# Patient Record
Sex: FEMALE | Race: WHITE | NOT HISPANIC OR LATINO | Employment: PART TIME | URBAN - METROPOLITAN AREA
[De-identification: names, ages, dates, MRNs, and addresses within clinical notes are randomized per-mention and may not be internally consistent; named-entity substitution may affect disease eponyms.]

---

## 2017-04-05 ENCOUNTER — LAB VISIT (OUTPATIENT)
Dept: LAB | Facility: HOSPITAL | Age: 72
End: 2017-04-05
Attending: INTERNAL MEDICINE
Payer: MEDICARE

## 2017-04-05 DIAGNOSIS — E78.5 HYPERLIPIDEMIA: ICD-10-CM

## 2017-04-05 LAB
ALBUMIN SERPL BCP-MCNC: 3.7 G/DL
ALP SERPL-CCNC: 87 U/L
ALT SERPL W/O P-5'-P-CCNC: 25 U/L
ANION GAP SERPL CALC-SCNC: 7 MMOL/L
AST SERPL-CCNC: 32 U/L
BILIRUB SERPL-MCNC: 0.4 MG/DL
BUN SERPL-MCNC: 18 MG/DL
CALCIUM SERPL-MCNC: 9.2 MG/DL
CHLORIDE SERPL-SCNC: 109 MMOL/L
CHOLEST/HDLC SERPL: 3.7 {RATIO}
CO2 SERPL-SCNC: 27 MMOL/L
CREAT SERPL-MCNC: 1 MG/DL
EST. GFR  (AFRICAN AMERICAN): >60 ML/MIN/1.73 M^2
EST. GFR  (NON AFRICAN AMERICAN): 56 ML/MIN/1.73 M^2
GLUCOSE SERPL-MCNC: 88 MG/DL
HDL/CHOLESTEROL RATIO: 26.9 %
HDLC SERPL-MCNC: 160 MG/DL
HDLC SERPL-MCNC: 43 MG/DL
LDLC SERPL CALC-MCNC: 97 MG/DL
NONHDLC SERPL-MCNC: 117 MG/DL
POTASSIUM SERPL-SCNC: 4.4 MMOL/L
PROT SERPL-MCNC: 7 G/DL
SODIUM SERPL-SCNC: 143 MMOL/L
TRIGL SERPL-MCNC: 100 MG/DL

## 2017-04-05 PROCEDURE — 80053 COMPREHEN METABOLIC PANEL: CPT

## 2017-04-05 PROCEDURE — 80061 LIPID PANEL: CPT

## 2017-04-05 PROCEDURE — 36415 COLL VENOUS BLD VENIPUNCTURE: CPT | Mod: PO

## 2017-04-10 ENCOUNTER — TELEPHONE (OUTPATIENT)
Dept: CARDIOLOGY | Facility: CLINIC | Age: 72
End: 2017-04-10

## 2017-06-09 ENCOUNTER — OFFICE VISIT (OUTPATIENT)
Dept: CARDIOLOGY | Facility: CLINIC | Age: 72
End: 2017-06-09
Payer: MEDICARE

## 2017-06-09 VITALS
BODY MASS INDEX: 21.22 KG/M2 | SYSTOLIC BLOOD PRESSURE: 136 MMHG | HEIGHT: 68 IN | WEIGHT: 140 LBS | HEART RATE: 64 BPM | DIASTOLIC BLOOD PRESSURE: 70 MMHG

## 2017-06-09 DIAGNOSIS — I25.10 CORONARY ARTERY DISEASE INVOLVING NATIVE CORONARY ARTERY OF NATIVE HEART WITHOUT ANGINA PECTORIS: ICD-10-CM

## 2017-06-09 DIAGNOSIS — I73.9 PVD (PERIPHERAL VASCULAR DISEASE): ICD-10-CM

## 2017-06-09 DIAGNOSIS — E78.2 MIXED HYPERLIPIDEMIA: ICD-10-CM

## 2017-06-09 DIAGNOSIS — I10 ESSENTIAL HYPERTENSION: Primary | ICD-10-CM

## 2017-06-09 PROCEDURE — 1159F MED LIST DOCD IN RCRD: CPT | Mod: S$GLB,,, | Performed by: INTERNAL MEDICINE

## 2017-06-09 PROCEDURE — 99213 OFFICE O/P EST LOW 20 MIN: CPT | Mod: S$GLB,,, | Performed by: INTERNAL MEDICINE

## 2017-06-09 PROCEDURE — 99999 PR PBB SHADOW E&M-EST. PATIENT-LVL III: CPT | Mod: PBBFAC,,, | Performed by: INTERNAL MEDICINE

## 2017-06-09 PROCEDURE — 1125F AMNT PAIN NOTED PAIN PRSNT: CPT | Mod: S$GLB,,, | Performed by: INTERNAL MEDICINE

## 2017-06-09 NOTE — PROGRESS NOTES
Subjective:   Patient ID:  Charlie Allen is a 72 y.o. female who presents for follow up of Coronary Artery Disease; Hyperlipidemia; and Hypertension      71 yo female, f/u with Dr. Castelan before.  Hx of nonobstructive CAD, s/p [facility] 2013. 2D echo today normal LVEF, HTN, PAD, Mild carotid Dz, and HLD.  Patient feels OK, no chest pain, no sob, no dizziness, no syncope, no CNS symptoms.  Patient has fairly good exercise tolerance.  Patient is compliant with medications.        Past Medical History:   Diagnosis Date    *Atrial flutter     CAD (coronary artery disease) 2013    Heart murmur     Hyperlipidemia     Hypertension        Past Surgical History:   Procedure Laterality Date     SECTION, CLASSIC      TONSILLECTOMY, ADENOIDECTOMY  as a child       Social History   Substance Use Topics    Smoking status: Never Smoker    Smokeless tobacco: Not on file    Alcohol use No       Family History   Problem Relation Age of Onset    Heart attack Mother     Heart failure Mother     Heart attack Father     Hyperlipidemia Father     Hypertension Father          Review of Systems   Constitution: Negative.   HENT: Negative.    Eyes: Negative.    Cardiovascular: Negative.  Negative for chest pain.   Respiratory: Negative.    Endocrine: Negative.    Hematologic/Lymphatic: Negative.    Skin: Negative.    Musculoskeletal: Negative.    Gastrointestinal: Negative.    Genitourinary: Positive for dysuria.   Neurological: Negative.    Psychiatric/Behavioral: Negative.    Allergic/Immunologic: Negative.        Objective:   Physical Exam   Constitutional: She is oriented to person, place, and time. She appears well-developed and well-nourished.   HENT:   Head: Normocephalic and atraumatic.   Eyes: Conjunctivae are normal. Pupils are equal, round, and reactive to light.   Neck: Normal range of motion. Neck supple.   Cardiovascular: Normal rate and regular rhythm.    Pulmonary/Chest: Effort normal and breath  sounds normal.   Abdominal: Soft. Bowel sounds are normal.   Musculoskeletal: Normal range of motion.   Neurological: She is alert and oriented to person, place, and time.   Skin: Skin is warm and dry.   Psychiatric: She has a normal mood and affect.       Lab Results   Component Value Date    CHOL 160 04/05/2017    CHOL 135 03/17/2016    CHOL 138 09/01/2015     Lab Results   Component Value Date    HDL 43 04/05/2017    HDL 38 (L) 03/17/2016    HDL 43 09/01/2015     Lab Results   Component Value Date    LDLCALC 97.0 04/05/2017    LDLCALC 78.0 03/17/2016    LDLCALC 77.8 09/01/2015     Lab Results   Component Value Date    TRIG 100 04/05/2017    TRIG 95 03/17/2016    TRIG 86 09/01/2015     Lab Results   Component Value Date    CHOLHDL 26.9 04/05/2017    CHOLHDL 28.1 03/17/2016    CHOLHDL 31.2 09/01/2015       Chemistry        Component Value Date/Time     04/05/2017 0847    K 4.4 04/05/2017 0847     04/05/2017 0847    CO2 27 04/05/2017 0847    BUN 18 04/05/2017 0847    CREATININE 1.0 04/05/2017 0847    GLU 88 04/05/2017 0847        Component Value Date/Time    CALCIUM 9.2 04/05/2017 0847    ALKPHOS 87 04/05/2017 0847    AST 32 04/05/2017 0847    ALT 25 04/05/2017 0847    BILITOT 0.4 04/05/2017 0847          No results found for: TSH  Lab Results   Component Value Date    INR 1.1 06/18/2014    INR 1.0 07/10/2013     Lab Results   Component Value Date    WBC 10.26 02/25/2015    HGB 12.8 02/25/2015    HCT 38.8 02/25/2015    MCV 88 02/25/2015     02/25/2015     BMP  Sodium   Date Value Ref Range Status   04/05/2017 143 136 - 145 mmol/L Final     Potassium   Date Value Ref Range Status   04/05/2017 4.4 3.5 - 5.1 mmol/L Final     Chloride   Date Value Ref Range Status   04/05/2017 109 95 - 110 mmol/L Final     CO2   Date Value Ref Range Status   04/05/2017 27 23 - 29 mmol/L Final     BUN, Bld   Date Value Ref Range Status   04/05/2017 18 8 - 23 mg/dL Final     Creatinine   Date Value Ref Range Status    04/05/2017 1.0 0.5 - 1.4 mg/dL Final     Calcium   Date Value Ref Range Status   04/05/2017 9.2 8.7 - 10.5 mg/dL Final     Anion Gap   Date Value Ref Range Status   04/05/2017 7 (L) 8 - 16 mmol/L Final     eGFR if    Date Value Ref Range Status   04/05/2017 >60.0 >60 mL/min/1.73 m^2 Final     eGFR if non    Date Value Ref Range Status   04/05/2017 56.0 (A) >60 mL/min/1.73 m^2 Final     Comment:     Calculation used to obtain the estimated glomerular filtration  rate (eGFR) is the CKD-EPI equation. Since race is unknown   in our information system, the eGFR values for   -American and Non--American patients are given   for each creatinine result.       CrCl cannot be calculated (Patient's most recent sCr result is older than the maximum 14 days allowed.).     Assessment:      1. Essential hypertension    2. Coronary artery disease involving native coronary artery of native heart without angina pectoris    3. PVD (peripheral vascular disease)    4. Mixed hyperlipidemia        Plan:   Continue current meds.  Recommend heart-healthy diet, weight control and regular exercise.  [place]. Risk modification.   RTC in 1 yr    I have reviewed all pertinent labs and cardiac studies. Plans and recommendations have been formulated under my direct supervision. All questions answered and patient voiced understanding. Patient to continue current medications.